# Patient Record
Sex: MALE | Race: WHITE | NOT HISPANIC OR LATINO | Employment: FULL TIME | URBAN - METROPOLITAN AREA
[De-identification: names, ages, dates, MRNs, and addresses within clinical notes are randomized per-mention and may not be internally consistent; named-entity substitution may affect disease eponyms.]

---
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
## 2022-10-14 ENCOUNTER — APPOINTMENT (OUTPATIENT)
Dept: RADIOLOGY | Facility: MEDICAL CENTER | Age: 22
End: 2022-10-14
Attending: EMERGENCY MEDICINE
Payer: MEDICAID

## 2022-10-14 ENCOUNTER — HOSPITAL ENCOUNTER (EMERGENCY)
Facility: MEDICAL CENTER | Age: 22
End: 2022-10-14
Attending: EMERGENCY MEDICINE
Payer: MEDICAID

## 2022-10-14 VITALS
OXYGEN SATURATION: 96 % | DIASTOLIC BLOOD PRESSURE: 79 MMHG | WEIGHT: 130 LBS | TEMPERATURE: 98.2 F | HEART RATE: 83 BPM | SYSTOLIC BLOOD PRESSURE: 112 MMHG | RESPIRATION RATE: 19 BRPM | HEIGHT: 70 IN | BODY MASS INDEX: 18.61 KG/M2

## 2022-10-14 DIAGNOSIS — S21.112A STAB WOUND OF LEFT CHEST, INITIAL ENCOUNTER: ICD-10-CM

## 2022-10-14 PROBLEM — S21.119A LACERATION OF CHEST WALL: Status: ACTIVE | Noted: 2022-10-14

## 2022-10-14 LAB
ABO GROUP BLD: NORMAL
ALBUMIN SERPL BCP-MCNC: 4.9 G/DL (ref 3.2–4.9)
ALBUMIN/GLOB SERPL: 2.9 G/DL
ALP SERPL-CCNC: 105 U/L (ref 30–99)
ALT SERPL-CCNC: 10 U/L (ref 2–50)
ANION GAP SERPL CALC-SCNC: 14 MMOL/L (ref 7–16)
APTT PPP: 25.6 SEC (ref 24.7–36)
AST SERPL-CCNC: 17 U/L (ref 12–45)
BILIRUB SERPL-MCNC: 1.2 MG/DL (ref 0.1–1.5)
BLD GP AB SCN SERPL QL: NORMAL
BUN SERPL-MCNC: 8 MG/DL (ref 8–22)
CALCIUM SERPL-MCNC: 9.7 MG/DL (ref 8.5–10.5)
CHLORIDE SERPL-SCNC: 105 MMOL/L (ref 96–112)
CO2 SERPL-SCNC: 20 MMOL/L (ref 20–33)
CREAT SERPL-MCNC: 1.09 MG/DL (ref 0.5–1.4)
ERYTHROCYTE [DISTWIDTH] IN BLOOD BY AUTOMATED COUNT: 39.7 FL (ref 35.9–50)
ETHANOL BLD-MCNC: <10.1 MG/DL
GFR SERPLBLD CREATININE-BSD FMLA CKD-EPI: 98 ML/MIN/1.73 M 2
GLOBULIN SER CALC-MCNC: 1.7 G/DL (ref 1.9–3.5)
GLUCOSE SERPL-MCNC: 92 MG/DL (ref 65–99)
HCT VFR BLD AUTO: 42.1 % (ref 42–52)
HGB BLD-MCNC: 14.7 G/DL (ref 14–18)
INR PPP: 1.08 (ref 0.87–1.13)
MCH RBC QN AUTO: 29.2 PG (ref 27–33)
MCHC RBC AUTO-ENTMCNC: 34.9 G/DL (ref 33.7–35.3)
MCV RBC AUTO: 83.5 FL (ref 81.4–97.8)
PLATELET # BLD AUTO: 270 K/UL (ref 164–446)
PMV BLD AUTO: 9 FL (ref 9–12.9)
POTASSIUM SERPL-SCNC: 3.4 MMOL/L (ref 3.6–5.5)
PROT SERPL-MCNC: 6.6 G/DL (ref 6–8.2)
PROTHROMBIN TIME: 13.9 SEC (ref 12–14.6)
RBC # BLD AUTO: 5.04 M/UL (ref 4.7–6.1)
RH BLD: NORMAL
SODIUM SERPL-SCNC: 139 MMOL/L (ref 135–145)
WBC # BLD AUTO: 6 K/UL (ref 4.8–10.8)

## 2022-10-14 PROCEDURE — 86900 BLOOD TYPING SEROLOGIC ABO: CPT

## 2022-10-14 PROCEDURE — 305308 HCHG STAPLER,SKIN,DISP.

## 2022-10-14 PROCEDURE — 85730 THROMBOPLASTIN TIME PARTIAL: CPT

## 2022-10-14 PROCEDURE — 700117 HCHG RX CONTRAST REV CODE 255: Performed by: EMERGENCY MEDICINE

## 2022-10-14 PROCEDURE — 80053 COMPREHEN METABOLIC PANEL: CPT

## 2022-10-14 PROCEDURE — 304217 HCHG IRRIGATION SYSTEM

## 2022-10-14 PROCEDURE — 85027 COMPLETE CBC AUTOMATED: CPT

## 2022-10-14 PROCEDURE — 700111 HCHG RX REV CODE 636 W/ 250 OVERRIDE (IP): Performed by: EMERGENCY MEDICINE

## 2022-10-14 PROCEDURE — 700101 HCHG RX REV CODE 250: Performed by: EMERGENCY MEDICINE

## 2022-10-14 PROCEDURE — 700105 HCHG RX REV CODE 258: Performed by: SURGERY

## 2022-10-14 PROCEDURE — 36415 COLL VENOUS BLD VENIPUNCTURE: CPT

## 2022-10-14 PROCEDURE — 71260 CT THORAX DX C+: CPT

## 2022-10-14 PROCEDURE — 90715 TDAP VACCINE 7 YRS/> IM: CPT | Performed by: EMERGENCY MEDICINE

## 2022-10-14 PROCEDURE — 71045 X-RAY EXAM CHEST 1 VIEW: CPT

## 2022-10-14 PROCEDURE — 90471 IMMUNIZATION ADMIN: CPT

## 2022-10-14 PROCEDURE — 700111 HCHG RX REV CODE 636 W/ 250 OVERRIDE (IP): Performed by: SURGERY

## 2022-10-14 PROCEDURE — 85610 PROTHROMBIN TIME: CPT

## 2022-10-14 PROCEDURE — 86850 RBC ANTIBODY SCREEN: CPT

## 2022-10-14 PROCEDURE — 86901 BLOOD TYPING SEROLOGIC RH(D): CPT

## 2022-10-14 PROCEDURE — 99243 OFF/OP CNSLTJ NEW/EST LOW 30: CPT | Performed by: SURGERY

## 2022-10-14 PROCEDURE — 305949 HCHG RED TRAUMA ACT PRE-NOTIFY NO CC

## 2022-10-14 PROCEDURE — 96365 THER/PROPH/DIAG IV INF INIT: CPT

## 2022-10-14 PROCEDURE — 304999 HCHG REPAIR-SIMPLE/INTERMED LEVEL 1

## 2022-10-14 PROCEDURE — 99285 EMERGENCY DEPT VISIT HI MDM: CPT

## 2022-10-14 PROCEDURE — 82077 ASSAY SPEC XCP UR&BREATH IA: CPT

## 2022-10-14 RX ORDER — BUPIVACAINE HYDROCHLORIDE AND EPINEPHRINE 5; 5 MG/ML; UG/ML
20 INJECTION, SOLUTION EPIDURAL; INTRACAUDAL; PERINEURAL ONCE
Status: COMPLETED | OUTPATIENT
Start: 2022-10-14 | End: 2022-10-14

## 2022-10-14 RX ADMIN — BUPIVACAINE HYDROCHLORIDE AND EPINEPHRINE BITARTRATE 20 ML: 5; .005 INJECTION, SOLUTION EPIDURAL; INTRACAUDAL; PERINEURAL at 13:00

## 2022-10-14 RX ADMIN — IOHEXOL 75 ML: 350 INJECTION, SOLUTION INTRAVENOUS at 12:40

## 2022-10-14 RX ADMIN — CLOSTRIDIUM TETANI TOXOID ANTIGEN (FORMALDEHYDE INACTIVATED), CORYNEBACTERIUM DIPHTHERIAE TOXOID ANTIGEN (FORMALDEHYDE INACTIVATED), BORDETELLA PERTUSSIS TOXOID ANTIGEN (GLUTARALDEHYDE INACTIVATED), BORDETELLA PERTUSSIS FILAMENTOUS HEMAGGLUTININ ANTIGEN (FORMALDEHYDE INACTIVATED), BORDETELLA PERTUSSIS PERTACTIN ANTIGEN, AND BORDETELLA PERTUSSIS FIMBRIAE 2/3 ANTIGEN 0.5 ML: 5; 2; 2.5; 5; 3; 5 INJECTION, SUSPENSION INTRAMUSCULAR at 13:17

## 2022-10-14 RX ADMIN — CEFAZOLIN 2 G: 2 INJECTION, POWDER, FOR SOLUTION INTRAMUSCULAR; INTRAVENOUS at 12:28
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
## 2022-10-24 ENCOUNTER — HOSPITAL ENCOUNTER (EMERGENCY)
Facility: MEDICAL CENTER | Age: 22
End: 2022-10-24
Payer: MEDICAID

## 2022-10-24 VITALS
TEMPERATURE: 97.7 F | SYSTOLIC BLOOD PRESSURE: 122 MMHG | RESPIRATION RATE: 16 BRPM | HEART RATE: 85 BPM | DIASTOLIC BLOOD PRESSURE: 62 MMHG | OXYGEN SATURATION: 96 %

## 2022-10-24 PROCEDURE — 99281 EMR DPT VST MAYX REQ PHY/QHP: CPT | Mod: EDC

## 2022-10-24 NOTE — ED TRIAGE NOTES
RN removed 2 staples from left chest. Wound appears to be healing well. Staples removed easily. Pt tolerated well.

## 2023-03-30 ENCOUNTER — HOSPITAL ENCOUNTER (EMERGENCY)
Facility: MEDICAL CENTER | Age: 23
End: 2023-03-30
Attending: STUDENT IN AN ORGANIZED HEALTH CARE EDUCATION/TRAINING PROGRAM
Payer: OTHER MISCELLANEOUS

## 2023-03-30 VITALS
WEIGHT: 130 LBS | TEMPERATURE: 98.5 F | SYSTOLIC BLOOD PRESSURE: 127 MMHG | DIASTOLIC BLOOD PRESSURE: 68 MMHG | BODY MASS INDEX: 18.61 KG/M2 | HEIGHT: 70 IN | HEART RATE: 77 BPM | RESPIRATION RATE: 16 BRPM | OXYGEN SATURATION: 96 %

## 2023-03-30 DIAGNOSIS — R55 SYNCOPE, UNSPECIFIED SYNCOPE TYPE: ICD-10-CM

## 2023-03-30 LAB
ALBUMIN SERPL BCP-MCNC: 4.6 G/DL (ref 3.2–4.9)
ALBUMIN/GLOB SERPL: 2.1 G/DL
ALP SERPL-CCNC: 103 U/L (ref 30–99)
ALT SERPL-CCNC: 12 U/L (ref 2–50)
ANION GAP SERPL CALC-SCNC: 13 MMOL/L (ref 7–16)
AST SERPL-CCNC: 21 U/L (ref 12–45)
BASOPHILS # BLD AUTO: 0.5 % (ref 0–1.8)
BASOPHILS # BLD: 0.03 K/UL (ref 0–0.12)
BILIRUB SERPL-MCNC: 0.8 MG/DL (ref 0.1–1.5)
BUN SERPL-MCNC: 14 MG/DL (ref 8–22)
CALCIUM ALBUM COR SERPL-MCNC: 8.9 MG/DL (ref 8.5–10.5)
CALCIUM SERPL-MCNC: 9.4 MG/DL (ref 8.5–10.5)
CHLORIDE SERPL-SCNC: 102 MMOL/L (ref 96–112)
CO2 SERPL-SCNC: 22 MMOL/L (ref 20–33)
CREAT SERPL-MCNC: 0.85 MG/DL (ref 0.5–1.4)
EKG IMPRESSION: NORMAL
EOSINOPHIL # BLD AUTO: 0.05 K/UL (ref 0–0.51)
EOSINOPHIL NFR BLD: 0.8 % (ref 0–6.9)
ERYTHROCYTE [DISTWIDTH] IN BLOOD BY AUTOMATED COUNT: 38.6 FL (ref 35.9–50)
GFR SERPLBLD CREATININE-BSD FMLA CKD-EPI: 125 ML/MIN/1.73 M 2
GLOBULIN SER CALC-MCNC: 2.2 G/DL (ref 1.9–3.5)
GLUCOSE SERPL-MCNC: 91 MG/DL (ref 65–99)
HCT VFR BLD AUTO: 44.3 % (ref 42–52)
HGB BLD-MCNC: 15.1 G/DL (ref 14–18)
IMM GRANULOCYTES # BLD AUTO: 0.03 K/UL (ref 0–0.11)
IMM GRANULOCYTES NFR BLD AUTO: 0.5 % (ref 0–0.9)
LYMPHOCYTES # BLD AUTO: 2.26 K/UL (ref 1–4.8)
LYMPHOCYTES NFR BLD: 37.8 % (ref 22–41)
MCH RBC QN AUTO: 28.9 PG (ref 27–33)
MCHC RBC AUTO-ENTMCNC: 34.1 G/DL (ref 33.7–35.3)
MCV RBC AUTO: 84.9 FL (ref 81.4–97.8)
MONOCYTES # BLD AUTO: 0.44 K/UL (ref 0–0.85)
MONOCYTES NFR BLD AUTO: 7.4 % (ref 0–13.4)
NEUTROPHILS # BLD AUTO: 3.17 K/UL (ref 1.82–7.42)
NEUTROPHILS NFR BLD: 53 % (ref 44–72)
NRBC # BLD AUTO: 0 K/UL
NRBC BLD-RTO: 0 /100 WBC
PLATELET # BLD AUTO: 255 K/UL (ref 164–446)
PMV BLD AUTO: 9.5 FL (ref 9–12.9)
POTASSIUM SERPL-SCNC: 3.8 MMOL/L (ref 3.6–5.5)
PROT SERPL-MCNC: 6.8 G/DL (ref 6–8.2)
RBC # BLD AUTO: 5.22 M/UL (ref 4.7–6.1)
SODIUM SERPL-SCNC: 137 MMOL/L (ref 135–145)
WBC # BLD AUTO: 6 K/UL (ref 4.8–10.8)

## 2023-03-30 PROCEDURE — 99283 EMERGENCY DEPT VISIT LOW MDM: CPT

## 2023-03-30 PROCEDURE — 80053 COMPREHEN METABOLIC PANEL: CPT

## 2023-03-30 PROCEDURE — 93005 ELECTROCARDIOGRAM TRACING: CPT | Performed by: STUDENT IN AN ORGANIZED HEALTH CARE EDUCATION/TRAINING PROGRAM

## 2023-03-30 PROCEDURE — 93005 ELECTROCARDIOGRAM TRACING: CPT

## 2023-03-30 PROCEDURE — 36415 COLL VENOUS BLD VENIPUNCTURE: CPT

## 2023-03-30 PROCEDURE — 85025 COMPLETE CBC W/AUTO DIFF WBC: CPT

## 2023-03-30 RX ORDER — LAMOTRIGINE 200 MG/1
1 TABLET ORAL
COMMUNITY

## 2023-03-30 RX ORDER — RISPERIDONE 4 MG/1
1 TABLET ORAL
COMMUNITY

## 2023-03-30 ASSESSMENT — FIBROSIS 4 INDEX: FIB4 SCORE: 0.46
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
## 2023-03-31 NOTE — DISCHARGE INSTRUCTIONS
Please return if you have any recurrent episodes of passing out, feel lightheaded, dizzy have any chest pain, shortness of breath or if you have any other new or acute concerns.
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
## 2023-04-06 ENCOUNTER — TELEPHONE (OUTPATIENT)
Dept: HEALTH INFORMATION MANAGEMENT | Facility: OTHER | Age: 23
End: 2023-04-06
Payer: MEDICAID

## 2023-08-24 ENCOUNTER — APPOINTMENT (OUTPATIENT)
Dept: RADIOLOGY | Facility: IMAGING CENTER | Age: 23
End: 2023-08-24
Attending: PHYSICIAN ASSISTANT
Payer: MEDICAID

## 2023-08-24 ENCOUNTER — OFFICE VISIT (OUTPATIENT)
Dept: URGENT CARE | Facility: CLINIC | Age: 23
End: 2023-08-24
Payer: MEDICAID

## 2023-08-24 ENCOUNTER — APPOINTMENT (OUTPATIENT)
Dept: URGENT CARE | Facility: PHYSICIAN GROUP | Age: 23
End: 2023-08-24
Payer: MEDICAID

## 2023-08-24 VITALS
SYSTOLIC BLOOD PRESSURE: 116 MMHG | HEIGHT: 66 IN | OXYGEN SATURATION: 100 % | DIASTOLIC BLOOD PRESSURE: 64 MMHG | BODY MASS INDEX: 23.19 KG/M2 | WEIGHT: 144.3 LBS | RESPIRATION RATE: 16 BRPM | TEMPERATURE: 98 F | HEART RATE: 85 BPM

## 2023-08-24 DIAGNOSIS — M79.644 THUMB PAIN, RIGHT: ICD-10-CM

## 2023-08-24 PROCEDURE — 99203 OFFICE O/P NEW LOW 30 MIN: CPT | Performed by: PHYSICIAN ASSISTANT

## 2023-08-24 PROCEDURE — 73130 X-RAY EXAM OF HAND: CPT | Mod: TC,RT | Performed by: PHYSICIAN ASSISTANT

## 2023-08-24 PROCEDURE — 3074F SYST BP LT 130 MM HG: CPT | Performed by: PHYSICIAN ASSISTANT

## 2023-08-24 PROCEDURE — 3078F DIAST BP <80 MM HG: CPT | Performed by: PHYSICIAN ASSISTANT

## 2023-08-24 ASSESSMENT — ENCOUNTER SYMPTOMS
SENSORY CHANGE: 0
CHILLS: 0
PAIN: 1
FOCAL WEAKNESS: 0
TINGLING: 0
FEVER: 0

## 2023-08-24 ASSESSMENT — FIBROSIS 4 INDEX: FIB4 SCORE: 0.55

## 2023-08-24 NOTE — PROGRESS NOTES
"  Subjective:     Justino Bonilla  is a 23 y.o. male who presents for Pain (X2 months right thumb finger pain/)      Pain  Pertinent negatives include no chills or fever.   Patient presents urgent care with family member present.  Patient recalls an injury to right hand that occurred 2 months ago.  He states he slipped and fell on some wet floor and landed with radial aspect of right thumb impacting the ground forcefully.  He noted some immediate pain thereafter but improved for some time but has progressively worsened over interim.  He reports no complete resolution of pain since original injury.  Patient is right-hand dominant.  Denies history of surgery or significant injury to right hand or thumb.  Has tried no treatments thus far.      Review of Systems   Constitutional:  Negative for chills and fever.   Musculoskeletal:  Positive for joint pain (right hand thumb).   Neurological:  Negative for tingling, sensory change and focal weakness.       Medications:    lamotrigine  OLANZapine Tabs  risperidone    Allergies: Patient has no known allergies.    Problem List: Justino Bonilla does not have any pertinent problems on file.    Surgical History:  Past Surgical History:   Procedure Laterality Date    TONSILLECTOMY AND ADENOIDECTOMY         Past Social Hx: Justino Bonilla  reports that he has never smoked. He has never used smokeless tobacco. He reports that he does not drink alcohol and does not use drugs.     Past Family Hx:  Justino Bonilla Family history is unknown by patient.     Problem list, medications, and allergies reviewed by myself today in Epic.     Objective:   /64 (BP Location: Left arm, Patient Position: Sitting)   Pulse 85   Temp 36.7 °C (98 °F) (Temporal)   Resp 16   Ht 1.676 m (5' 6\")   Wt 65.5 kg (144 lb 4.8 oz)   SpO2 100%   BMI 23.29 kg/m²     Physical Exam  Vitals and nursing note reviewed.   Constitutional:       General: He is not in acute distress.     Appearance: " Normal appearance. He is well-developed. He is not diaphoretic.   HENT:      Head: Normocephalic and atraumatic.      Right Ear: External ear normal.      Left Ear: External ear normal.      Nose: Nose normal.   Eyes:      General: No scleral icterus.        Right eye: No discharge.         Left eye: No discharge.      Conjunctiva/sclera: Conjunctivae normal.   Pulmonary:      Effort: Pulmonary effort is normal. No respiratory distress.   Musculoskeletal:         General: Normal range of motion.      Cervical back: Neck supple.      Comments: Grossly normal exam of right thumb: Tenderness to palpation over MCP joint, possible ulnar collateral laxity but does seem to have firm endpoint, no effusions, no erythema, pain with terminal range of motion, no pain over anatomic snuffbox, negative Yergason   Skin:     General: Skin is warm and dry.      Coloration: Skin is not pale.   Neurological:      Mental Status: He is alert and oriented to person, place, and time.      Coordination: Coordination normal.     DX HAND -   FINDINGS:     MINERALIZATION: Mineralization is unremarkable for age.     INJURY: No acute fracture or gross malalignment is seen.     JOINTS: No erosive arthropathy is evident.           IMPRESSION:     No radiographic evidence of acute traumatic injury.           Exam Ended: 08/24/23  1:01 PM Last Resulted: 08/24/23  1:07 PM             Patient is fit with thumb spica thumb brace.  He tolerates this well    Assessment/Plan:   Assessment      1. Thumb pain, right  - DX-HAND 3+ RIGHT; Future  - Referral to Orthopedics    Recommend conservative care, rest, ice, elevation, work on gentle ROM exercises, wear thumb spica for the next few weeks to ensure it continues to be helpful, OTC Tylenol/ibuprofen, with persistent pain follow-up with orthopedics, referral placed today  Return to clinic with lack of resolution or progression of symptoms.      I have worn an N95 mask, gloves and eye protection for the  entire encounter with this patient.     Differential diagnosis, natural history, supportive care, and indications for immediate follow-up discussed.

## 2024-06-25 ENCOUNTER — HOSPITAL ENCOUNTER (OUTPATIENT)
Dept: RADIOLOGY | Facility: MEDICAL CENTER | Age: 24
End: 2024-06-25
Attending: FAMILY MEDICINE
Payer: MEDICAID

## 2024-06-25 ENCOUNTER — OFFICE VISIT (OUTPATIENT)
Dept: URGENT CARE | Facility: PHYSICIAN GROUP | Age: 24
End: 2024-06-25
Payer: MEDICAID

## 2024-06-25 VITALS
TEMPERATURE: 98.5 F | SYSTOLIC BLOOD PRESSURE: 114 MMHG | WEIGHT: 147.71 LBS | DIASTOLIC BLOOD PRESSURE: 64 MMHG | BODY MASS INDEX: 23.74 KG/M2 | HEIGHT: 66 IN | OXYGEN SATURATION: 97 % | RESPIRATION RATE: 15 BRPM | HEART RATE: 71 BPM

## 2024-06-25 DIAGNOSIS — M54.50 RECURRENT LOW BACK PAIN: ICD-10-CM

## 2024-06-25 LAB
APPEARANCE UR: CLEAR
BILIRUB UR STRIP-MCNC: NEGATIVE MG/DL
COLOR UR AUTO: YELLOW
GLUCOSE UR STRIP.AUTO-MCNC: NEGATIVE MG/DL
KETONES UR STRIP.AUTO-MCNC: NEGATIVE MG/DL
LEUKOCYTE ESTERASE UR QL STRIP.AUTO: NEGATIVE
NITRITE UR QL STRIP.AUTO: NEGATIVE
PH UR STRIP.AUTO: 5.5 [PH] (ref 5–8)
PROT UR QL STRIP: NEGATIVE MG/DL
RBC UR QL AUTO: NEGATIVE
SP GR UR STRIP.AUTO: 1.02
UROBILINOGEN UR STRIP-MCNC: 0.2 MG/DL

## 2024-06-25 PROCEDURE — 72100 X-RAY EXAM L-S SPINE 2/3 VWS: CPT

## 2024-06-25 PROCEDURE — 99213 OFFICE O/P EST LOW 20 MIN: CPT | Performed by: FAMILY MEDICINE

## 2024-06-25 PROCEDURE — 3074F SYST BP LT 130 MM HG: CPT | Performed by: FAMILY MEDICINE

## 2024-06-25 PROCEDURE — 81002 URINALYSIS NONAUTO W/O SCOPE: CPT | Performed by: FAMILY MEDICINE

## 2024-06-25 PROCEDURE — 3078F DIAST BP <80 MM HG: CPT | Performed by: FAMILY MEDICINE

## 2024-06-25 RX ORDER — CYCLOBENZAPRINE HCL 10 MG
10 TABLET ORAL 3 TIMES DAILY PRN
Qty: 20 TABLET | Refills: 0 | Status: SHIPPED | OUTPATIENT
Start: 2024-06-25

## 2024-06-25 RX ORDER — LAMOTRIGINE 150 MG/1
TABLET ORAL
COMMUNITY
Start: 2024-05-31

## 2024-06-25 ASSESSMENT — ENCOUNTER SYMPTOMS
WEIGHT LOSS: 0
VOMITING: 0
NAUSEA: 0
MYALGIAS: 0
EYE DISCHARGE: 0
EYE REDNESS: 0

## 2024-06-25 ASSESSMENT — FIBROSIS 4 INDEX: FIB4 SCORE: 0.57

## 2024-06-25 NOTE — PROGRESS NOTES
"Subjective     Justino Bonilla is a 24 y.o. male who presents with Rib Pain (B/l rib pain in the middle of his ribs x1 day. No known injury or trauma, started yesterday at work when he was cleaning. )            Recurrent low back pain since seizure 11/2023.  Current symptoms worse and severe since last night and worse on right. Standing up seemed to trigger the pain.  No radiation.  No myelopathy.  No fever.  No PMH cancer or unwanted weight loss.  Some relief with heat.   No other aggravating or alleviating factors.        Review of Systems   Constitutional:  Negative for malaise/fatigue and weight loss.   Eyes:  Negative for discharge and redness.   Gastrointestinal:  Negative for nausea and vomiting.   Musculoskeletal:  Negative for joint pain and myalgias.   Skin:  Negative for itching and rash.              Objective     /64 (BP Location: Right arm, Patient Position: Sitting, BP Cuff Size: Adult)   Pulse 71   Temp 36.9 °C (98.5 °F) (Temporal)   Resp 15   Ht 1.676 m (5' 6\") Comment: Pt reported  Wt 67 kg (147 lb 11.3 oz)   SpO2 97%   BMI 23.84 kg/m²      Physical Exam  Constitutional:       General: He is not in acute distress.     Appearance: He is well-developed.   HENT:      Head: Normocephalic and atraumatic.   Eyes:      Conjunctiva/sclera: Conjunctivae normal.   Cardiovascular:      Rate and Rhythm: Normal rate and regular rhythm.      Heart sounds: Normal heart sounds. No murmur heard.  Pulmonary:      Effort: Pulmonary effort is normal.      Breath sounds: Normal breath sounds. No wheezing.   Musculoskeletal:        Back:    Skin:     General: Skin is warm and dry.      Findings: No rash.   Neurological:      Mental Status: He is alert.      Deep Tendon Reflexes: Reflexes normal.      Comments: Bilateral lower extremity strength and sensory intact.    Straight leg raise reproduces back symptoms.  Does not elicit radicular symptoms.                             Assessment & Plan   X-ray " negative per radiology     1. Recurrent low back pain  DX-LUMBAR SPINE-2 OR 3 VIEWS    POCT Urinalysis    Referral to establish with PCP    cyclobenzaprine (FLEXERIL) 10 mg Tab            Differential diagnosis, natural history, supportive care, and indications for immediate follow-up were discussed.

## 2024-06-25 NOTE — LETTER
June 25, 2024         Patient: Justino Bonilla   YOB: 2000   Date of Visit: 6/25/2024           To Whom it May Concern:    Justino Bonilla was seen in my clinic on 6/25/2024. Please excuse from work 6/25 and 6/26/2024.       Sincerely,           Lucius Giles M.D.  Electronically Signed

## 2024-07-13 ENCOUNTER — OFFICE VISIT (OUTPATIENT)
Dept: URGENT CARE | Facility: PHYSICIAN GROUP | Age: 24
End: 2024-07-13
Payer: MEDICAID

## 2024-07-13 VITALS
SYSTOLIC BLOOD PRESSURE: 108 MMHG | TEMPERATURE: 99.1 F | WEIGHT: 147 LBS | HEIGHT: 66 IN | DIASTOLIC BLOOD PRESSURE: 62 MMHG | OXYGEN SATURATION: 97 % | RESPIRATION RATE: 16 BRPM | BODY MASS INDEX: 23.63 KG/M2 | HEART RATE: 110 BPM

## 2024-07-13 DIAGNOSIS — M54.50 RECURRENT LOW BACK PAIN: ICD-10-CM

## 2024-07-13 PROCEDURE — 99213 OFFICE O/P EST LOW 20 MIN: CPT | Performed by: FAMILY MEDICINE

## 2024-07-13 PROCEDURE — 3078F DIAST BP <80 MM HG: CPT | Performed by: FAMILY MEDICINE

## 2024-07-13 PROCEDURE — 3074F SYST BP LT 130 MM HG: CPT | Performed by: FAMILY MEDICINE

## 2024-07-13 RX ORDER — CYCLOBENZAPRINE HCL 10 MG
10 TABLET ORAL 3 TIMES DAILY PRN
Qty: 30 TABLET | Refills: 1 | Status: SHIPPED | OUTPATIENT
Start: 2024-07-13

## 2024-07-13 ASSESSMENT — FIBROSIS 4 INDEX: FIB4 SCORE: 0.57

## 2024-07-17 ASSESSMENT — ENCOUNTER SYMPTOMS
MYALGIAS: 0
EYE DISCHARGE: 0
EYE REDNESS: 0
WEIGHT LOSS: 0
VOMITING: 0
NAUSEA: 0

## 2024-12-20 ENCOUNTER — TELEPHONE (OUTPATIENT)
Dept: HEALTH INFORMATION MANAGEMENT | Facility: OTHER | Age: 24
End: 2024-12-20
Payer: MEDICAID

## 2025-04-10 ENCOUNTER — APPOINTMENT (OUTPATIENT)
Dept: RADIOLOGY | Facility: MEDICAL CENTER | Age: 25
End: 2025-04-10
Attending: EMERGENCY MEDICINE
Payer: MEDICAID

## 2025-04-10 ENCOUNTER — OFFICE VISIT (OUTPATIENT)
Dept: URGENT CARE | Facility: CLINIC | Age: 25
End: 2025-04-10
Payer: MEDICAID

## 2025-04-10 ENCOUNTER — HOSPITAL ENCOUNTER (EMERGENCY)
Facility: MEDICAL CENTER | Age: 25
End: 2025-04-10
Attending: EMERGENCY MEDICINE
Payer: MEDICAID

## 2025-04-10 VITALS
WEIGHT: 153.22 LBS | HEART RATE: 79 BPM | SYSTOLIC BLOOD PRESSURE: 116 MMHG | RESPIRATION RATE: 18 BRPM | DIASTOLIC BLOOD PRESSURE: 79 MMHG | BODY MASS INDEX: 24 KG/M2 | TEMPERATURE: 98.4 F | OXYGEN SATURATION: 98 %

## 2025-04-10 VITALS
DIASTOLIC BLOOD PRESSURE: 78 MMHG | BODY MASS INDEX: 23.86 KG/M2 | OXYGEN SATURATION: 97 % | HEIGHT: 67 IN | WEIGHT: 152 LBS | RESPIRATION RATE: 14 BRPM | HEART RATE: 87 BPM | SYSTOLIC BLOOD PRESSURE: 124 MMHG | TEMPERATURE: 97.8 F

## 2025-04-10 DIAGNOSIS — R11.0 NAUSEA: ICD-10-CM

## 2025-04-10 DIAGNOSIS — H53.2 DOUBLE VISION: ICD-10-CM

## 2025-04-10 DIAGNOSIS — R42 DIZZINESS: ICD-10-CM

## 2025-04-10 DIAGNOSIS — R29.898 ARM WEAKNESS: ICD-10-CM

## 2025-04-10 DIAGNOSIS — R20.0 RIGHT FACIAL NUMBNESS: ICD-10-CM

## 2025-04-10 PROCEDURE — 99215 OFFICE O/P EST HI 40 MIN: CPT | Performed by: PHYSICIAN ASSISTANT

## 2025-04-10 PROCEDURE — 99282 EMERGENCY DEPT VISIT SF MDM: CPT

## 2025-04-10 ASSESSMENT — ENCOUNTER SYMPTOMS
WEAKNESS: 1
BLURRED VISION: 1
DIZZINESS: 1
NAUSEA: 1

## 2025-04-10 NOTE — PROGRESS NOTES
"Subjective:   Justino Bonilla is a 25 y.o. male who presents for Diplopia ([id number], losing balance, nausea )      5-6 years double vision and losing balance, but has been worse the last few months.  He has not been evaluated for this in the past.  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  Currently feels mildly off balance.  Symptoms seem to wax and wane throughout the day, but occur everyday. He has never had his vision checked.  Can note double vision and/or losing balance, but nausea is always present.     He has felt slightly numb in mouth and throat occasionally including last night.  Feels slightly numb in right arm. Has been noting some decreased hearing over the course of 7 months worse on the left.     Review of Systems   Eyes:  Positive for blurred vision.   Gastrointestinal:  Positive for nausea.   Neurological:  Positive for dizziness and weakness.       Medications, Allergies, and current problem list reviewed today in Epic.     Objective:     /78   Pulse 87   Temp 36.6 °C (97.8 °F) (Temporal)   Resp 14   Ht 1.702 m (5' 7\")   Wt 68.9 kg (152 lb)   SpO2 97%     Physical Exam  Vitals reviewed.   Constitutional:       Appearance: Normal appearance.   HENT:      Head: Normocephalic and atraumatic.      Right Ear: Tympanic membrane, ear canal and external ear normal.      Left Ear: Tympanic membrane, ear canal and external ear normal.      Nose: Nose normal. No rhinorrhea.      Mouth/Throat:      Mouth: Mucous membranes are moist.   Eyes:      Extraocular Movements: Extraocular movements intact.      Conjunctiva/sclera: Conjunctivae normal.      Pupils: Pupils are equal, round, and reactive to light.   Cardiovascular:      Rate and Rhythm: Normal rate and regular rhythm.   Pulmonary:      Effort: Pulmonary effort is normal.      Breath sounds: Normal breath sounds.   Skin:     General: Skin is warm and dry.      Capillary Refill: Capillary refill takes " less than 2 seconds.   Neurological:      General: No focal deficit present.      Mental Status: He is alert and oriented to person, place, and time.      Comments: Poor balance, equivocal Romberg         Assessment/Plan:     Diagnosis and associated orders:     1. Double vision        2. Nausea        3. Arm weakness        4. Right facial numbness           Comments/MDM:     Patient with a 6 or 7-year history of symptoms but describes a larger and more frequent as well as more intense symptomatology for the last 2 to 3 weeks.  Out of urgent care I discussed with the patient that he has 2 options, the first which is to present to the emergency room given his symptoms are rapidly progressing, alternatively he may consider up to follow-up with primary care to provide some routine screening as well as potentially to follow-up with an ophthalmologist given the visual symptoms.  After a very long conversation about potential etiologies which are quite vast at present he decided an emergency room evaluation would provide a lot of reassurance that there is no life-threatening or serious condition that requires him immediate management prior to him getting established with a primary care         Differential diagnosis, natural history, supportive care, and indications for immediate follow-up discussed.    Advised the patient to follow-up with the primary care physician for recheck, reevaluation, and consideration of further management.    Please note that this dictation was created using voice recognition software. I have made a reasonable attempt to correct obvious errors, but I expect that there are errors of grammar and possibly content that I did not discover before finalizing the note.    This note was electronically signed by Gabriel Pérez PA-C

## 2025-04-10 NOTE — ED TRIAGE NOTES
Chief Complaint   Patient presents with    Visual Problems     Double vision, intermittent since he was 19    Dizziness     intermittent    Nausea     While sleeping     Pt reports that he was sent from  for further eval. Reports increased dizziness and double vision. Reports that nausea is only present when sleeping and resolves when he wakes.   Pt with nerissa hx. Neuro intact.   /76   Pulse 78   Temp 36.9 °C (98.4 °F) (Temporal)   Resp 18   Wt 69.5 kg (153 lb 3.5 oz)   SpO2 96%   Pt informed of wait times. Educated on triage process.  Asked to return to triage RN for any new or worsening of symptoms. Thanked for patience.

## 2025-04-10 NOTE — ED PROVIDER NOTES
ED Provider Note    ED PHYSICIAN NOTE    CHIEF COMPLAINT  Chief Complaint   Patient presents with    Visual Problems     Double vision, intermittent since he was 19    Dizziness     intermittent    Nausea     While sleeping       EXTERNAL RECORDS REVIEWED  Outpatient Notes urgent care visit from today patient noted 5 to 6 years of double vision    HPI/ROS  LIMITATION TO HISTORY   Select: : None  OUTSIDE HISTORIAN(S):  none    Justino Bonilla is a 25 y.o. male who presents reporting intermittent balance issues as well as intermittent double vision.  Patient reports he has had symptoms since he has been 19 over the last 6 years.  He states that usually feels like he has a balance issue in the morning and then is goes away and does not return it is not every morning.  He reports no dizziness currently or balance issues currently.  He reports that he will also get some intermittent double vision that also seems to come and go he does not have any current double vision.  He reports no headaches, no focal weakness or numbness.  No eye pain.  Reports no lightheadedness or syncope.  No chest pain or shortness of breath.  He also reports that he will get nausea when he is sleeping but only when he is sleeping and this has been over the last few weeks.  No vomiting, no abdominal pain, no fevers or chills.  No diarrhea    PAST MEDICAL HISTORY  Past Medical History:   Diagnosis Date    Dizziness     Recurrent canker sores     Traumatic brain injury (HCC)     brain injury at 4 weeks, skull fracture       SOCIAL HISTORY  Social History     Tobacco Use    Smoking status: Never    Smokeless tobacco: Never   Vaping Use    Vaping status: Never Used   Substance Use Topics    Alcohol use: No    Drug use: No       CURRENT MEDICATIONS  Home Medications       Reviewed by Jaye Ramirez R.N. (Registered Nurse) on 04/10/25 at 1415  Med List Status: Partial     Medication Last Dose Status   cyclobenzaprine (FLEXERIL) 10 mg Tab   Active   cyclobenzaprine (FLEXERIL) 10 mg Tab  Active   lamotrigine (LAMICTAL) 150 MG tablet  Active   lamotrigine (LAMICTAL) 200 MG tablet  Active                    ALLERGIES  No Known Allergies    PHYSICAL EXAM  VITAL SIGNS: /79   Pulse 79   Temp 36.9 °C (98.4 °F) (Temporal)   Resp 18   Wt 69.5 kg (153 lb 3.5 oz)   SpO2 98%   BMI 24.00 kg/m²    Constitutional: Alert in no apparent distress.  HENT: No signs of trauma, grossly normal inspection   Eyes: Pupils are equal and reactive, Conjunctiva normal, Non-icteric.   Neck: Normal range of motion, No tenderness, Supple, No stridor.   Cardiovascular: Regular rate and rhythm, no murmurs. Intact distal pulses at radial  Thorax & Lungs: Normal breath sounds, No respiratory distress, No wheezing, No chest tenderness.   Abdomen:, Soft, No tenderness, No masses, No pulsatile masses. No peritoneal signs.  Skin: Warm, Dry, no obvious rash  Back: No bony tenderness, No CVA tenderness.   Extremities: No edema, No tenderness, No cyanosis, Negative Key's sign.  Musculoskeletal: Good range of motion in all major joints. No tenderness to palpation or major deformities noted.   Neurologic: Alert, cranial nerves intact, extraocular movement is intact without any diplopia no nystagmus, speech is appropriate or not slurred, upper extremities bilaterally exhibit no drift, no dysmetria, 5 out of 5 strength with bilateral bicep/tricep/, sensation intact to light touch throughout upper extremities. Lower extremities strength 5 out of 5 thigh extension/flexion/abduction/adduction, knee extension/flexion, dorsiflexion plantar flexion.  sensation intact to light touch.  No focal deficits noted. Ambulates with steady gait, steady tandem gait  Psychiatric: Affect normal and mood normal for situation          DIAGNOSTIC STUDIES / PROCEDURES  LABS/EKG  Labs Reviewed - No data to display          COURSE & MEDICAL DECISION MAKING    INITIAL ASSESSMENT, COURSE AND PLAN  Care  Narrative: 4:56 PM  Patient presents with intermittent symptoms for 6 years of double vision and balance issues when he wakes up.  He was seen at urgent care and sent here.  He has a reassuring neurologic exam here and reports no persistent symptoms.  Order for diagnostic labs, CT head with consideration for electrolyte or metabolic derangement, renal sufficiency, seems unlikely to be mass given the intermittent nature of his symptoms, long duration and unremarkable neurologic exam although these are all considered.  More chronic pathologies certainly considered as he has had symptoms for 5 to 6 years     I was informed by nursing that patient did not want to wait around to have testing performed and left AGAINST MEDICAL ADVICE before discussing the         PROBLEM LIST  This is a 25-year-old male presented with intermittent episodes of double vision and balance issues for 5 or 6 years.  He did decide he actually did not want further workup and left AGAINST MEDICAL ADVICE without talking to me as above        DISPOSITION AND DISCUSSIONS    Escalation of care considered, and ultimately not performed:after discussion with the patient / family, they have elected to decline an escalation in care    Patient eloped AGAINST MEDICAL ADVICE    FINAL DIAGNOSIS  1. Double vision        2. Dizziness               This dictation was created using voice recognition software. The accuracy of the dictation is limited to the abilities of the software. I expect there may be some errors of grammar and possibly content. The nursing notes were reviewed and certain aspects of this information were incorporated into this note.    Electronically signed by: Domingo Han M.D., 4/10/2025

## 2025-04-11 NOTE — ED NOTES
Pt left ED AMA a/o x 4 GCS 15 ambulatory without assistance with steady gait, PIV 18 G L AC removed with catheter intact bleeding controlled gauze and tape applied pt tolerated well, PIV removed per pt request. ERP aware pt leaving AMA. Pt signed AMA paperwork.